# Patient Record
Sex: FEMALE | Race: BLACK OR AFRICAN AMERICAN | HISPANIC OR LATINO
[De-identification: names, ages, dates, MRNs, and addresses within clinical notes are randomized per-mention and may not be internally consistent; named-entity substitution may affect disease eponyms.]

---

## 2021-06-23 ENCOUNTER — NON-APPOINTMENT (OUTPATIENT)
Age: 46
End: 2021-06-23

## 2021-06-23 PROBLEM — Z00.00 ENCOUNTER FOR PREVENTIVE HEALTH EXAMINATION: Status: ACTIVE | Noted: 2021-06-23

## 2021-06-24 ENCOUNTER — APPOINTMENT (OUTPATIENT)
Dept: OBGYN | Facility: CLINIC | Age: 46
End: 2021-06-24
Payer: COMMERCIAL

## 2021-06-24 VITALS
SYSTOLIC BLOOD PRESSURE: 120 MMHG | HEIGHT: 66 IN | BODY MASS INDEX: 26.52 KG/M2 | DIASTOLIC BLOOD PRESSURE: 70 MMHG | WEIGHT: 165 LBS

## 2021-06-24 DIAGNOSIS — Z83.3 FAMILY HISTORY OF DIABETES MELLITUS: ICD-10-CM

## 2021-06-24 DIAGNOSIS — Z78.9 OTHER SPECIFIED HEALTH STATUS: ICD-10-CM

## 2021-06-24 PROCEDURE — 99205 OFFICE O/P NEW HI 60 MIN: CPT

## 2021-06-24 PROCEDURE — 99072 ADDL SUPL MATRL&STAF TM PHE: CPT

## 2021-07-06 PROBLEM — Z83.3 FAMILY HISTORY OF TYPE 2 DIABETES MELLITUS: Status: ACTIVE | Noted: 2021-07-06

## 2021-07-06 PROBLEM — Z78.9 NON-SMOKER: Status: ACTIVE | Noted: 2021-07-06

## 2021-07-06 PROBLEM — Z78.9 NO PERTINENT PAST MEDICAL HISTORY: Status: RESOLVED | Noted: 2021-07-06 | Resolved: 2021-07-06

## 2021-07-06 PROBLEM — Z78.9 SOCIAL ALCOHOL USE: Status: ACTIVE | Noted: 2021-07-06

## 2021-07-06 PROBLEM — Z78.9 DOES NOT USE ILLICIT DRUGS: Status: ACTIVE | Noted: 2021-07-06

## 2021-07-06 NOTE — HISTORY OF PRESENT ILLNESS
[Patient reported mammogram was normal] : Patient reported mammogram was normal [Patient reported PAP Smear was normal] : Patient reported PAP Smear was normal [N] : Patient does not use contraception [Y] : Patient is sexually active [Normal Amount/Duration] :  normal amount and duration [Regular Cycle Intervals] : periods have been regular [Menarche Age: ____] : age at menarche was [unfilled] [Currently Active] : currently active [Mammogramdate] : 05/2021 [PapSmeardate] : 03/2021 [LMPDate] : 06/24/2021 [PGHxTotal] : 2 [[facility] : [facility] [FreeTextEntry1] : 06/24/2021 [Men] : men

## 2021-07-06 NOTE — DISCUSSION/SUMMARY
[FreeTextEntry1] : I sat down with the patient to discuss the imaging findings & her symptoms which warrant surgical intervention. I explained that the heavy bleeding and pain bleeding is likely related to myomata, adenomyosis and also enlarged uterus.  Discussion about management options for heavy bleeding and pain including hormonal and non-hormonal medication, pain medication.  Oral contraceptive pill, LNG IUD, NuvaRing and TXA. Hysteroscopy with endometrial ablation.  Uterine artery embolization.  Also myomectomy and hysterectomy. We discussed type of hysterectomy including total and supracervical.  We reviewed that there is no proven medical benefit to keeping the cervix, and removal in the future is more difficult.  I recommend definitive hysterectomy with bilateral salpingectomy via a minimally invasive approach.   \par \par The options for surgical approach including open, vaginal, and laparoscopic with or without robotic assistance were discussed and the patient agrees with plan for laparoscopic hysterectomy with bilateral salpingectomy.  The differential diagnosis was discussed in detail. The indications, risks, benefits and alternatives were discussed. but not limited to, conversion to laparotomy, bleeding, infection, injury to surrounding organs was discussed at length.  Chance of occult injury and need for future surgery.  Deon Nicolas expressed an understanding of the treatment rationale and her questions were answered to her apparent satisfaction.  She was given written information about postoperative care and diagrams of the pelvic anatomy.\par \par

## 2021-07-06 NOTE — PHYSICAL EXAM
[Appropriately responsive] : appropriately responsive [Alert] : alert [No Acute Distress] : no acute distress [Soft] : soft [Non-tender] : non-tender [Non-distended] : non-distended [Oriented x3] : oriented x3 [Labia Majora] : normal [Labia Minora] : normal [Normal] : normal [Tenderness] : nontender [Enlarged ___ wks] : enlarged [unfilled] ~Uweeks [Uterine Adnexae] : normal

## 2021-07-21 ENCOUNTER — NON-APPOINTMENT (OUTPATIENT)
Age: 46
End: 2021-07-21

## 2021-08-20 ENCOUNTER — LABORATORY RESULT (OUTPATIENT)
Age: 46
End: 2021-08-20

## 2021-08-20 VITALS
SYSTOLIC BLOOD PRESSURE: 126 MMHG | RESPIRATION RATE: 16 BRPM | DIASTOLIC BLOOD PRESSURE: 78 MMHG | HEIGHT: 66 IN | TEMPERATURE: 98 F | WEIGHT: 164.02 LBS | HEART RATE: 69 BPM | OXYGEN SATURATION: 100 %

## 2021-08-20 NOTE — PRE-OP CHECKLIST - COMMENTS
[phone] (M)    Clemente Sanchez MD   Family Practice    Payor: [employer] / Plan: [employer] PREFERRED HPN [id number] / Product Type: PPO MISC    From: Jordan Alaniz MD  Sent: 1/2/2019   2:24 PM    RESECTION SOFT TISSUE MASS left thigh, with excision of skin lesion  [address]    Surgery scheduling requirements include:  Date of Surgery: Elective- anytime--asap and anytime  Facility: [facility]  Admission Type: Admit to outpatient in a bed  Time Needed: 60 MINS  Anesthesia: General  Surgical Assist: yes, surgical assist and yes, Regla Jaquez NP  Special Equipment: SOFT TISSUE TRAY    Consent: At Facility  Blood Donation: none  Pathway class: no  PREHAB needed? no    Preop history and physical: Yes, with patient's PCP  Preop testing: CBC, BMP, CXR, EKG   Special preop instructions: Bilateral TEDS and SCD's, shave operative site preop, capped IV.  Preop antibiotics: If <150# Ancef 1 gm IV, if >150# Ancef 2 gm IV less than 30 minutes before surgery. If patient has allergy to PCN, then give Vancomycin 1 gram IVPB over 60 minutes (not more than 90 minutes prior to surgery)  Service to Anticoagulation Clinic needed postop: No     Schedule postop appointment for 2 weeks postop.      
Contacted patient to confirm surgery for 1/8/19 scheduled at 7 am with an arrival time of 5 am. Left VM for patient to return call to confirm.       
Patient has decided  1/8/19    Confirmed surgery at [facility] with Gavin    Laterality confirmed:  Left thigh    PCP - Dr muse - pre-op on 12/7  NPO - ok  ASA - no  Ride after - yes  Work Comp - no  Insurance - yes  1st Post Op - 1/23/19 at 1030  2nd Post Op - 3/11/19 at 915 - [place]    Case created yes  Service to Family Practice done  Scheduled in [place]  Letter to Patient sent     Patient stated when surgery was discussed with Dr Alaniz, it was mentioned that local would be used instead of general anesthesia.  is awaiting response from Dr Alaniz    Update: Dr Alaniz has agreed to use Mac and Local anesthesia. Patient is aware.     Anesthesia has been changed with Gavin in OR  
Pre-Admissions has confirmed surgery time and arrival.   
Had electrolyte drink this am

## 2021-08-22 ENCOUNTER — TRANSCRIPTION ENCOUNTER (OUTPATIENT)
Age: 46
End: 2021-08-22

## 2021-08-23 ENCOUNTER — RESULT REVIEW (OUTPATIENT)
Age: 46
End: 2021-08-23

## 2021-08-23 ENCOUNTER — INPATIENT (INPATIENT)
Facility: HOSPITAL | Age: 46
LOS: 1 days | Discharge: HOME | DRG: 742 | End: 2021-08-25
Attending: OBSTETRICS & GYNECOLOGY | Admitting: OBSTETRICS & GYNECOLOGY
Payer: COMMERCIAL

## 2021-08-23 ENCOUNTER — TRANSCRIPTION ENCOUNTER (OUTPATIENT)
Age: 46
End: 2021-08-23

## 2021-08-23 ENCOUNTER — APPOINTMENT (OUTPATIENT)
Dept: OBGYN | Facility: HOSPITAL | Age: 46
End: 2021-08-23

## 2021-08-23 DIAGNOSIS — Z98.890 OTHER SPECIFIED POSTPROCEDURAL STATES: Chronic | ICD-10-CM

## 2021-08-23 DIAGNOSIS — D25.9 LEIOMYOMA OF UTERUS, UNSPECIFIED: ICD-10-CM
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
## 2021-08-23 DIAGNOSIS — Z98.51 TUBAL LIGATION STATUS: Chronic | ICD-10-CM

## 2021-08-23 DIAGNOSIS — Z98.891 HISTORY OF UTERINE SCAR FROM PREVIOUS SURGERY: Chronic | ICD-10-CM

## 2021-08-23 DIAGNOSIS — N99.71 ACCIDENTAL PUNCTURE AND LACERATION OF A GENITOURINARY SYSTEM ORGAN OR STRUCTURE DURING A GENITOURINARY SYSTEM PROCEDURE: ICD-10-CM
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
## 2021-08-23 DIAGNOSIS — N32.89 OTHER SPECIFIED DISORDERS OF BLADDER: ICD-10-CM

## 2021-08-23 LAB
BLD GP AB SCN SERPL QL: NEGATIVE — SIGNIFICANT CHANGE UP
GLUCOSE BLDC GLUCOMTR-MCNC: 91 MG/DL — SIGNIFICANT CHANGE UP (ref 70–99)
RH IG SCN BLD-IMP: POSITIVE — SIGNIFICANT CHANGE UP

## 2021-08-23 PROCEDURE — 88307 TISSUE EXAM BY PATHOLOGIST: CPT | Mod: 26

## 2021-08-23 PROCEDURE — 51865 REPAIR OF BLADDER WOUND: CPT

## 2021-08-23 PROCEDURE — 99233 SBSQ HOSP IP/OBS HIGH 50: CPT | Mod: 25

## 2021-08-23 PROCEDURE — 58573 TLH W/T/O UTERUS OVER 250 G: CPT

## 2021-08-23 RX ORDER — OXYCODONE HYDROCHLORIDE 5 MG/1
5 TABLET ORAL
Refills: 0 | Status: DISCONTINUED | OUTPATIENT
Start: 2021-08-23 | End: 2021-08-25

## 2021-08-23 RX ORDER — CELECOXIB 200 MG/1
400 CAPSULE ORAL ONCE
Refills: 0 | Status: COMPLETED | OUTPATIENT
Start: 2021-08-23 | End: 2021-08-23

## 2021-08-23 RX ORDER — HEPARIN SODIUM 5000 [USP'U]/ML
5000 INJECTION INTRAVENOUS; SUBCUTANEOUS ONCE
Refills: 0 | Status: COMPLETED | OUTPATIENT
Start: 2021-08-23 | End: 2021-08-23

## 2021-08-23 RX ORDER — SODIUM CHLORIDE 9 MG/ML
1000 INJECTION, SOLUTION INTRAVENOUS
Refills: 0 | Status: DISCONTINUED | OUTPATIENT
Start: 2021-08-23 | End: 2021-08-24

## 2021-08-23 RX ORDER — OXYBUTYNIN CHLORIDE 5 MG
5 TABLET ORAL
Refills: 0 | Status: DISCONTINUED | OUTPATIENT
Start: 2021-08-23 | End: 2021-08-25

## 2021-08-23 RX ORDER — ONDANSETRON 8 MG/1
8 TABLET, FILM COATED ORAL EVERY 8 HOURS
Refills: 0 | Status: DISCONTINUED | OUTPATIENT
Start: 2021-08-23 | End: 2021-08-25

## 2021-08-23 RX ORDER — ACETAMINOPHEN 500 MG
1000 TABLET ORAL ONCE
Refills: 0 | Status: COMPLETED | OUTPATIENT
Start: 2021-08-23 | End: 2021-08-23

## 2021-08-23 RX ORDER — GABAPENTIN 400 MG/1
600 CAPSULE ORAL ONCE
Refills: 0 | Status: COMPLETED | OUTPATIENT
Start: 2021-08-23 | End: 2021-08-23

## 2021-08-23 RX ORDER — OXYCODONE HYDROCHLORIDE 5 MG/1
10 TABLET ORAL EVERY 4 HOURS
Refills: 0 | Status: DISCONTINUED | OUTPATIENT
Start: 2021-08-23 | End: 2021-08-25

## 2021-08-23 RX ORDER — KETOROLAC TROMETHAMINE 30 MG/ML
30 SYRINGE (ML) INJECTION EVERY 8 HOURS
Refills: 0 | Status: DISCONTINUED | OUTPATIENT
Start: 2021-08-23 | End: 2021-08-24

## 2021-08-23 RX ORDER — SIMETHICONE 80 MG/1
80 TABLET, CHEWABLE ORAL EVERY 6 HOURS
Refills: 0 | Status: DISCONTINUED | OUTPATIENT
Start: 2021-08-23 | End: 2021-08-25

## 2021-08-23 RX ORDER — ACETAMINOPHEN 500 MG
1000 TABLET ORAL EVERY 6 HOURS
Refills: 0 | Status: DISCONTINUED | OUTPATIENT
Start: 2021-08-23 | End: 2021-08-25

## 2021-08-23 RX ORDER — HYDROMORPHONE HYDROCHLORIDE 2 MG/ML
0.5 INJECTION INTRAMUSCULAR; INTRAVENOUS; SUBCUTANEOUS
Refills: 0 | Status: DISCONTINUED | OUTPATIENT
Start: 2021-08-23 | End: 2021-08-25

## 2021-08-23 RX ORDER — POLYETHYLENE GLYCOL 3350 17 G/17G
17 POWDER, FOR SOLUTION ORAL AT BEDTIME
Refills: 0 | Status: DISCONTINUED | OUTPATIENT
Start: 2021-08-23 | End: 2021-08-25

## 2021-08-23 RX ADMIN — OXYCODONE HYDROCHLORIDE 10 MILLIGRAM(S): 5 TABLET ORAL at 20:55

## 2021-08-23 RX ADMIN — Medication 30 MILLIGRAM(S): at 17:37

## 2021-08-23 RX ADMIN — CELECOXIB 400 MILLIGRAM(S): 200 CAPSULE ORAL at 14:57

## 2021-08-23 RX ADMIN — HEPARIN SODIUM 5000 UNIT(S): 5000 INJECTION INTRAVENOUS; SUBCUTANEOUS at 09:34

## 2021-08-23 RX ADMIN — OXYCODONE HYDROCHLORIDE 10 MILLIGRAM(S): 5 TABLET ORAL at 21:43

## 2021-08-23 RX ADMIN — Medication 1000 MILLIGRAM(S): at 19:00

## 2021-08-23 RX ADMIN — Medication 1000 MILLIGRAM(S): at 14:57

## 2021-08-23 RX ADMIN — Medication 1000 MILLIGRAM(S): at 18:04

## 2021-08-23 RX ADMIN — Medication 1 TABLET(S): at 18:02

## 2021-08-23 RX ADMIN — Medication 30 MILLIGRAM(S): at 16:20

## 2021-08-23 RX ADMIN — GABAPENTIN 600 MILLIGRAM(S): 400 CAPSULE ORAL at 09:34

## 2021-08-23 RX ADMIN — Medication 1000 MILLIGRAM(S): at 23:44

## 2021-08-23 RX ADMIN — CELECOXIB 400 MILLIGRAM(S): 200 CAPSULE ORAL at 09:33

## 2021-08-23 RX ADMIN — Medication 5 MILLIGRAM(S): at 17:51

## 2021-08-23 RX ADMIN — Medication 1000 MILLIGRAM(S): at 09:34

## 2021-08-23 RX ADMIN — SODIUM CHLORIDE 125 MILLILITER(S): 9 INJECTION, SOLUTION INTRAVENOUS at 14:57

## 2021-08-23 NOTE — H&P ADULT - HISTORY OF PRESENT ILLNESS
25 to presents for a scheduled TL/S TLH  + BS due to symptomatic myomata. Had recent imaging and uterus and myomata has grown. Uterus is 14 x 8.3 x 13.8 cm with several myomata. Cycles are heavier with cramping, she can feel her abdomen moving. She reports that she had tubal ligation at the time of her second  sections.     Preventative Visit:   Mammogram: 2021, Patient reported mammogram was normal.   PAP Smear: 2021, Patient reported PAP Smear was normal.   LMP: 2021   Contraception: Patient does not use contraception.   Sexually Activity: Patient is sexually active.    s/p CS x2    Menstrual history:   LMP: 2021, normal amount and duration.   Menses: periods have been regular, age at menarche was 12.     Active Problems  Enlarged uterus   Menorrhagia with regular cycle  Uterine myoma     Past Medical History  No pertinent past medical history     Surgical History  History of  Section x2  Bilateral tubal ligation    Vital Signs Last 24 Hrs  T(C): --  T(F): --  HR: --  BP: --  BP(mean): --  RR: --  SpO2: --    Physical Exam:  Gen: NAD, comfortable  GI: soft, nontender, nondistended + BS, no rebound no guarding  Ext: no edema, erythema, tenderness     LABS:  HB - 11.2  Cr - 0.76     46 to presents for a scheduled TL/S TLH  + BS due to symptomatic myomata. Had recent imaging and uterus and myomata has grown. Uterus is 14 x 8.3 x 13.8 cm with several myomata. Cycles are heavier with cramping, she can feel her abdomen moving. She reports that she had tubal ligation at the time of her second  sections.     Preventative Visit:   Mammogram: 2021, Patient reported mammogram was normal.   PAP Smear: 2021, Patient reported PAP Smear was normal.   LMP: 2021   Contraception: Patient does not use contraception.   Sexually Activity: Patient is sexually active.    s/p CS x2    Menstrual history:   LMP: 2021, normal amount and duration.   Menses: periods have been regular, age at menarche was 12.     Active Problems  Enlarged uterus   Menorrhagia with regular cycle  Uterine myoma     Past Medical History  No pertinent past medical history     Surgical History  History of  Section x2  Bilateral tubal ligation    Vital Signs Last 24 Hrs  T(C): --  T(F): --  HR: --  BP: --  BP(mean): --  RR: --  SpO2: --    Physical Exam:  Gen: NAD, comfortable  GI: soft, nontender, nondistended + BS, no rebound no guarding  Ext: no edema, erythema, tenderness     LABS:  HB - 11.2  Cr - 0.76

## 2021-08-23 NOTE — PROGRESS NOTE ADULT - SUBJECTIVE AND OBJECTIVE BOX
GYN Post Op Check     Patient seen at bedside.  Pain controlled. Tolerating sips. Not OOB yet. Watt in place.    Denies CP, palpitations, SOB, fever, chills, nausea, vomiting.    Vital Signs Last 24 Hrs  T(C): 37.2 (23 Aug 2021 20:00), Max: 37.2 (23 Aug 2021 20:00)  T(F): 98.9 (23 Aug 2021 20:00), Max: 98.9 (23 Aug 2021 20:00)  HR: 70 (23 Aug 2021 20:00) (68 - 80)  BP: 122/76 (23 Aug 2021 20:00) (122/76 - 141/74)  BP(mean): 92 (23 Aug 2021 16:57) (92 - 102)  RR: 17 (23 Aug 2021 20:00) (14 - 20)  SpO2: 98% (23 Aug 2021 20:00) (98% - 100%)    Physical Exam:  Gen: Well-appearing. NAD.  Resp: Breathing comfortably on RA. Lungs CTAB.  Abd: Soft, appropriately tender, mildly distended, decreased BS, no rebound, no guarding. Dermabond C/D/I. ORACIO drain in place with serosanguinous fluid.  : Watt to gravity draining clear urine.  Ext: SCDs in place. No calf tenderness.    I&O's Summary    23 Aug 2021 07:01  -  23 Aug 2021 23:00  --------------------------------------------------------  IN: 975 mL / OUT: 905 mL / NET: 70 mL      MEDICATIONS  (STANDING):  acetaminophen   Tablet .. 1000 milliGRAM(s) Oral every 6 hours  ketorolac   Injectable 30 milliGRAM(s) IV Push every 8 hours  lactated ringers. 1000 milliLiter(s) (125 mL/Hr) IV Continuous <Continuous>  oxybutynin 5 milliGRAM(s) Oral two times a day  trimethoprim   80 mG/sulfamethoxazole 400 mG 1 Tablet(s) Oral daily    MEDICATIONS  (PRN):  acetaminophen   Tablet .. 1000 milliGRAM(s) Oral every 6 hours PRN Mild Pain (1 - 3)  HYDROmorphone  Injectable 0.5 milliGRAM(s) IV Push every 15 minutes PRN Severe Pain (7 - 10)  ondansetron    Tablet 8 milliGRAM(s) Oral every 8 hours PRN Nausea and/or Vomiting  oxyCODONE    IR 5 milliGRAM(s) Oral every 3 hours PRN Moderate Pain (4 - 6)  oxyCODONE    IR 10 milliGRAM(s) Oral every 4 hours PRN Severe Pain (7 - 10)  polyethylene glycol 3350 17 Gram(s) Oral at bedtime PRN Constipation  simethicone 80 milliGRAM(s) Chew every 6 hours PRN Gas    Allergies    No Known Allergies    Intolerances        LABS:

## 2021-08-23 NOTE — PROGRESS NOTE ADULT - ASSESSMENT
45 yo POD#0 s/p TLH+ BS and cystotomy repair (by urology) for a 16 weeks fibroid uterus    1. Neuro/Pain:  Tylenol, Toradol and Dilaudid  2  CV: VS per routine  3. Pulm: Encourage ISS  4. GI: Clears, consider advance in the AM  5. :  Martinez to remain for 14 days (until 9/6), Urology will coordinate CT cystogram prior to removal of the martinez as an out patient. Check Cr from the ORACIO drain at the day of dispo. Oxybutnin 5mg BID for bladder spasm  6. Heme: AM Labs  7. ID: single strength Bactrim (80/400) daily as long as Martinez in.  8. lines: LLQ 19F ORACIO drain (125cc over 2h)   9. DVT ppx: SCDs, Lovenox in the AM after CBC        Christina Al MD PGY2

## 2021-08-23 NOTE — BRIEF OPERATIVE NOTE - OPERATION/FINDINGS
S/p previous C/S x2. Adhesions between anterior part of the uterus and the bladder and abdominal wall.  Fibroid uterus 16 weeks in size (s/p previous BTL).  Otherwise normal abdominal anatomy.  After dissecting the cervix and uterus from the vagina a cystotomy was noted - urology team called to participate in the repair.  A mini-lap of 4cm was done on previous Pfannenstiel incision to allow bladder repair (see brief op note buy urology).  Through the mini-lap the uterus was removed in pieces.  Cystoscopy at the end of the case shoed closed suture line and normal trigon with bilateral ureteral jets seen.  A 19F ORACIO drain was inserted for postop surveillance.

## 2021-08-23 NOTE — BRIEF OPERATIVE NOTE - OPERATION/FINDINGS
see dictation 6 cm tear at dome of bladder, extending posteriorly.; UOs intact, clear efflux seen before and after repair. Posterior wall denuded. Multilayer closure performed. See dictation for further detail.

## 2021-08-23 NOTE — H&P ADULT - ASSESSMENT
25 to presents for a scheduled TL/S TLH  + BS due to symptomatic myomata.  Admission pending intra-op findings. 46 to presents for a scheduled TL/S TLH  + BS due to symptomatic myomata.  Admission pending intra-op findings.

## 2021-08-23 NOTE — H&P ADULT - NSICDXPASTSURGICALHX_GEN_ALL_CORE_FT
PAST SURGICAL HISTORY:  H/O  section x 2    H/O tubal ligation     S/P excision of lipoma leg, arm

## 2021-08-23 NOTE — BRIEF OPERATIVE NOTE - NSICDXBRIEFPROCEDURE_GEN_ALL_CORE_FT
PROCEDURES:  Repair of cystotomy 23-Aug-2021 14:15:36  Knae Plata  
PROCEDURES:  Repair of cystotomy 23-Aug-2021 14:15:36  Kane Plata  Laparoscopic total hysterectomy with cystoscopy 23-Aug-2021 14:37:25  Mark Noel  Laparoscopic salpingectomy 23-Aug-2021 14:37:34  Mark Noel

## 2021-08-23 NOTE — BRIEF OPERATIVE NOTE - NSICDXBRIEFPOSTOP_GEN_ALL_CORE_FT
POST-OP DIAGNOSIS:  Fibroid uterus 23-Aug-2021 14:16:28  Kane Plata  
POST-OP DIAGNOSIS:  Fibroid uterus 23-Aug-2021 14:16:28  Kane Plata

## 2021-08-23 NOTE — BRIEF OPERATIVE NOTE - NSICDXBRIEFPREOP_GEN_ALL_CORE_FT
PRE-OP DIAGNOSIS:  Fibroid uterus 23-Aug-2021 14:16:05  Kane Plata  
PRE-OP DIAGNOSIS:  Fibroid uterus 23-Aug-2021 14:16:05  Kane Plata

## 2021-08-24 LAB
ANION GAP SERPL CALC-SCNC: 7 MMOL/L — SIGNIFICANT CHANGE UP (ref 5–17)
BUN SERPL-MCNC: 7 MG/DL — SIGNIFICANT CHANGE UP (ref 7–23)
CALCIUM SERPL-MCNC: 8.6 MG/DL — SIGNIFICANT CHANGE UP (ref 8.4–10.5)
CHLORIDE SERPL-SCNC: 106 MMOL/L — SIGNIFICANT CHANGE UP (ref 96–108)
CO2 SERPL-SCNC: 26 MMOL/L — SIGNIFICANT CHANGE UP (ref 22–31)
COVID-19 SPIKE DOMAIN AB INTERP: POSITIVE
COVID-19 SPIKE DOMAIN ANTIBODY RESULT: >250 U/ML — HIGH
CREAT FLD-MCNC: 0.89 MG/DL — SIGNIFICANT CHANGE UP
CREAT SERPL-MCNC: 0.72 MG/DL — SIGNIFICANT CHANGE UP (ref 0.5–1.3)
GLUCOSE SERPL-MCNC: 95 MG/DL — SIGNIFICANT CHANGE UP (ref 70–99)
HCT VFR BLD CALC: 27.3 % — LOW (ref 34.5–45)
HGB BLD-MCNC: 8.7 G/DL — LOW (ref 11.5–15.5)
MAGNESIUM SERPL-MCNC: 1.7 MG/DL — SIGNIFICANT CHANGE UP (ref 1.6–2.6)
MCHC RBC-ENTMCNC: 29 PG — SIGNIFICANT CHANGE UP (ref 27–34)
MCHC RBC-ENTMCNC: 31.9 GM/DL — LOW (ref 32–36)
MCV RBC AUTO: 91 FL — SIGNIFICANT CHANGE UP (ref 80–100)
NRBC # BLD: 0 /100 WBCS — SIGNIFICANT CHANGE UP (ref 0–0)
PHOSPHATE SERPL-MCNC: 3 MG/DL — SIGNIFICANT CHANGE UP (ref 2.5–4.5)
PLATELET # BLD AUTO: 186 K/UL — SIGNIFICANT CHANGE UP (ref 150–400)
POTASSIUM SERPL-MCNC: 3.8 MMOL/L — SIGNIFICANT CHANGE UP (ref 3.5–5.3)
POTASSIUM SERPL-SCNC: 3.8 MMOL/L — SIGNIFICANT CHANGE UP (ref 3.5–5.3)
RBC # BLD: 3 M/UL — LOW (ref 3.8–5.2)
RBC # FLD: 15.4 % — HIGH (ref 10.3–14.5)
SARS-COV-2 IGG+IGM SERPL QL IA: >250 U/ML — HIGH
SARS-COV-2 IGG+IGM SERPL QL IA: POSITIVE
SODIUM SERPL-SCNC: 139 MMOL/L — SIGNIFICANT CHANGE UP (ref 135–145)
WBC # BLD: 7.27 K/UL — SIGNIFICANT CHANGE UP (ref 3.8–10.5)
WBC # FLD AUTO: 7.27 K/UL — SIGNIFICANT CHANGE UP (ref 3.8–10.5)

## 2021-08-24 PROCEDURE — 99232 SBSQ HOSP IP/OBS MODERATE 35: CPT

## 2021-08-24 PROCEDURE — 99024 POSTOP FOLLOW-UP VISIT: CPT

## 2021-08-24 RX ORDER — POTASSIUM CHLORIDE 20 MEQ
20 PACKET (EA) ORAL ONCE
Refills: 0 | Status: COMPLETED | OUTPATIENT
Start: 2021-08-24 | End: 2021-08-24

## 2021-08-24 RX ORDER — ENOXAPARIN SODIUM 100 MG/ML
40 INJECTION SUBCUTANEOUS EVERY 24 HOURS
Refills: 0 | Status: DISCONTINUED | OUTPATIENT
Start: 2021-08-24 | End: 2021-08-25

## 2021-08-24 RX ORDER — MAGNESIUM SULFATE 500 MG/ML
2 VIAL (ML) INJECTION ONCE
Refills: 0 | Status: COMPLETED | OUTPATIENT
Start: 2021-08-24 | End: 2021-08-24

## 2021-08-24 RX ORDER — IBUPROFEN 200 MG
600 TABLET ORAL EVERY 6 HOURS
Refills: 0 | Status: DISCONTINUED | OUTPATIENT
Start: 2021-08-24 | End: 2021-08-25

## 2021-08-24 RX ADMIN — Medication 30 MILLIGRAM(S): at 06:00

## 2021-08-24 RX ADMIN — Medication 1 TABLET(S): at 11:09

## 2021-08-24 RX ADMIN — Medication 1000 MILLIGRAM(S): at 05:12

## 2021-08-24 RX ADMIN — Medication 600 MILLIGRAM(S): at 17:50

## 2021-08-24 RX ADMIN — Medication 600 MILLIGRAM(S): at 17:15

## 2021-08-24 RX ADMIN — Medication 5 MILLIGRAM(S): at 05:12

## 2021-08-24 RX ADMIN — OXYCODONE HYDROCHLORIDE 10 MILLIGRAM(S): 5 TABLET ORAL at 23:00

## 2021-08-24 RX ADMIN — Medication 1000 MILLIGRAM(S): at 00:44

## 2021-08-24 RX ADMIN — Medication 1000 MILLIGRAM(S): at 17:15

## 2021-08-24 RX ADMIN — Medication 1000 MILLIGRAM(S): at 06:00

## 2021-08-24 RX ADMIN — Medication 5 MILLIGRAM(S): at 17:14

## 2021-08-24 RX ADMIN — Medication 20 MILLIEQUIVALENT(S): at 09:19

## 2021-08-24 RX ADMIN — ENOXAPARIN SODIUM 40 MILLIGRAM(S): 100 INJECTION SUBCUTANEOUS at 07:52

## 2021-08-24 RX ADMIN — OXYCODONE HYDROCHLORIDE 10 MILLIGRAM(S): 5 TABLET ORAL at 22:12

## 2021-08-24 RX ADMIN — Medication 600 MILLIGRAM(S): at 11:09

## 2021-08-24 RX ADMIN — Medication 50 GRAM(S): at 09:19

## 2021-08-24 RX ADMIN — Medication 1000 MILLIGRAM(S): at 11:38

## 2021-08-24 RX ADMIN — Medication 1000 MILLIGRAM(S): at 17:50

## 2021-08-24 RX ADMIN — Medication 1000 MILLIGRAM(S): at 11:08

## 2021-08-24 RX ADMIN — Medication 600 MILLIGRAM(S): at 11:38

## 2021-08-24 RX ADMIN — Medication 30 MILLIGRAM(S): at 05:12

## 2021-08-24 NOTE — PROGRESS NOTE ADULT - ASSESSMENT
45 yo POD#1 s/p TLH+ BS and cystotomy repair (by urology) and cystoscopy, for a 16 weeks fibroid uterus  PostOp HB - pending  Martinez to remain in place for 14 days.    1. Neuro/Pain:  Tylenol, Toradol  2  CV: VS per routine  3. Pulm: Encourage ISS  4. GI: Clears, will advance to LRD  5. :  Martinez to remain for 14 days (until 9/6), Urology will coordinate CT cystogram prior to removal of the martinez as an out patient. Check Cr from the ORACIO drain at the day of dispo. Oxybutnin 5mg BID for bladder spasm  6. Heme: AM Labs  7. ID: single strength Bactrim (80/400) daily as long as Martinez in.  8. lines: LLQ 19F ORACIO drain (125cc over 2h)   9. DVT ppx: SCDs, Lovenox in the AM after CBC

## 2021-08-24 NOTE — PROGRESS NOTE ADULT - SUBJECTIVE AND OBJECTIVE BOX
SUBJECTIVE: NAEON. AVSS. Denies f/c/n/v, back/flank pain. Appropriate abdominal pain.    enoxaparin Injectable 40 milliGRAM(s) SubCutaneous every 24 hours  trimethoprim   80 mG/sulfamethoxazole 400 mG 1 Tablet(s) Oral daily      Vital Signs Last 24 Hrs  T(C): 36.8 (24 Aug 2021 08:05), Max: 37.2 (23 Aug 2021 20:00)  T(F): 98.3 (24 Aug 2021 08:05), Max: 98.9 (23 Aug 2021 20:00)  HR: 61 (24 Aug 2021 08:05) (60 - 80)  BP: 121/76 (24 Aug 2021 08:05) (112/69 - 141/74)  BP(mean): 92 (23 Aug 2021 16:57) (92 - 102)  RR: 18 (24 Aug 2021 08:05) (14 - 20)  SpO2: 98% (24 Aug 2021 08:05) (98% - 100%)  I&O's Detail    23 Aug 2021 07:01  -  24 Aug 2021 07:00  --------------------------------------------------------  IN:    Lactated Ringers: 2000 mL    Oral Fluid: 460 mL  Total IN: 2460 mL    OUT:    Bulb (mL): 305 mL    Indwelling Catheter - Urethral (mL): 1430 mL  Total OUT: 1735 mL    Total NET: 725 mL      24 Aug 2021 07:01  -  24 Aug 2021 11:01  --------------------------------------------------------  IN:    Oral Fluid: 360 mL  Total IN: 360 mL    OUT:    Bulb (mL): 30 mL  Total OUT: 30 mL    Total NET: 330 mL          Physical Exam:  General: No acute distress, resting comfortably in bed  Pulm: Nonlabored breathing, no respiratory distress  Abd: soft, appropriately TTP  : martinez in place draining clear yellow urine. ORACIO output SS.      LABS:                        8.7    7.27  )-----------( 186      ( 24 Aug 2021 07:48 )             27.3     08-24    139  |  106  |  7   ----------------------------<  95  3.8   |  26  |  0.72    Ca    8.6      24 Aug 2021 07:48  Phos  3.0     08-24  Mg     1.7     08-24            RADIOLOGY & ADDITIONAL STUDIES:

## 2021-08-24 NOTE — PROGRESS NOTE ADULT - ASSESSMENT
47 yo POD#1 s/p TLH+ BS and cystotomy repair (by urology) and cystoscopy, for a 16 weeks fibroid uterus. GABRIELLEEON. AVSS. Urine clear yellow. ORACIO SS with serous Cr.    Recommendations:  - martinez catheter for 10-14 days total  - CT Cystogram prior to martinez removal  - Should follow up as an outpatient with Dr. Christian in ~2 wks   47 yo POD#1 s/p TLH+ BS and cystotomy repair (by urology) and cystoscopy, for a 16 weeks fibroid uterus. NAEON. AVSS. Urine clear yellow. ORACIO SS with slightly elevated ORACIO Cr.    Recommendations:  - If pt staying another day, would keep ORACIO, trend output, and repeat ORACIO Cr tomorrow morning (8/25). If she is going to be discharged today, would recommend sending home with ORACIO and Dr. Christian would remove in the office.  - martinez catheter for 10-14 days total  - CT Cystogram prior to martinez removal  - Should follow up as an outpatient with Dr. Christian in ~2 wks   47 yo POD#1 s/p TLH+ BS and cystotomy repair (by urology) and cystoscopy, for a 16 weeks fibroid uterus. NAEON. AVSS. Urine clear yellow. ORACIO SS with slightly elevated ORACIO Cr (0.73 vs 0.89).    Recommendations:  - If pt staying another day, would keep ORACIO, trend output, and repeat ORACIO Cr tomorrow morning (8/25). If she is going to be discharged today, would recommend sending home with ORACIO and Dr. Christian would remove in the office.  - Martinez catheter for 10-14 days total  - CT Cystogram prior to martinez removal  - Should follow up as an outpatient with Dr. Christian in ~2 wks

## 2021-08-24 NOTE — PROGRESS NOTE ADULT - SUBJECTIVE AND OBJECTIVE BOX
GYN Progress Note    Patient seen at bedside.  Pain controlled on Tylenol and Toradol  Tolerating clears  Has not gotten OOB yet.  Watt is place - clear.  +flatus  Denies CP, palpitations, SOB, fever, chills, nausea, vomiting.    Vital Signs Last 24 Hrs  T(C): 37.1 (24 Aug 2021 05:13), Max: 37.2 (23 Aug 2021 20:00)  T(F): 98.7 (24 Aug 2021 05:13), Max: 98.9 (23 Aug 2021 20:00)  HR: 65 (24 Aug 2021 05:13) (60 - 80)  BP: 123/73 (24 Aug 2021 05:13) (112/69 - 141/74)  BP(mean): 92 (23 Aug 2021 16:57) (92 - 102)  RR: 15 (24 Aug 2021 05:13) (14 - 20)  SpO2: 100% (24 Aug 2021 05:13) (98% - 100%)    Physical Exam:  Gen: No Acute Distress  Pulm: Clear to auscultation bilaterally  GI: soft, appropriately nontender, nondistended, +BS, no rebound, no guarding.  Incision C/D/I  Ext: SCDs in place, wnl    I&O's Summary    23 Aug 2021 07:01  -  24 Aug 2021 07:00  --------------------------------------------------------  IN: 2460 mL / OUT: 1735 mL / NET: 725 mL      MEDICATIONS  (STANDING):  acetaminophen   Tablet .. 1000 milliGRAM(s) Oral every 6 hours  ketorolac   Injectable 30 milliGRAM(s) IV Push every 8 hours  lactated ringers. 1000 milliLiter(s) (125 mL/Hr) IV Continuous <Continuous>  oxybutynin 5 milliGRAM(s) Oral two times a day  trimethoprim   80 mG/sulfamethoxazole 400 mG 1 Tablet(s) Oral daily    MEDICATIONS  (PRN):  acetaminophen   Tablet .. 1000 milliGRAM(s) Oral every 6 hours PRN Mild Pain (1 - 3)  HYDROmorphone  Injectable 0.5 milliGRAM(s) IV Push every 15 minutes PRN Severe Pain (7 - 10)  ondansetron    Tablet 8 milliGRAM(s) Oral every 8 hours PRN Nausea and/or Vomiting  oxyCODONE    IR 5 milliGRAM(s) Oral every 3 hours PRN Moderate Pain (4 - 6)  oxyCODONE    IR 10 milliGRAM(s) Oral every 4 hours PRN Severe Pain (7 - 10)  polyethylene glycol 3350 17 Gram(s) Oral at bedtime PRN Constipation  simethicone 80 milliGRAM(s) Chew every 6 hours PRN Gas      LABS:

## 2021-08-25 ENCOUNTER — TRANSCRIPTION ENCOUNTER (OUTPATIENT)
Age: 46
End: 2021-08-25

## 2021-08-25 VITALS
OXYGEN SATURATION: 100 % | SYSTOLIC BLOOD PRESSURE: 108 MMHG | HEART RATE: 65 BPM | DIASTOLIC BLOOD PRESSURE: 67 MMHG | TEMPERATURE: 98 F | RESPIRATION RATE: 16 BRPM

## 2021-08-25 LAB
ANION GAP SERPL CALC-SCNC: 5 MMOL/L — SIGNIFICANT CHANGE UP (ref 5–17)
BUN SERPL-MCNC: 8 MG/DL — SIGNIFICANT CHANGE UP (ref 7–23)
CALCIUM SERPL-MCNC: 8.8 MG/DL — SIGNIFICANT CHANGE UP (ref 8.4–10.5)
CHLORIDE SERPL-SCNC: 109 MMOL/L — HIGH (ref 96–108)
CO2 SERPL-SCNC: 26 MMOL/L — SIGNIFICANT CHANGE UP (ref 22–31)
CREAT FLD-MCNC: 0.79 MG/DL — SIGNIFICANT CHANGE UP
CREAT SERPL-MCNC: 0.67 MG/DL — SIGNIFICANT CHANGE UP (ref 0.5–1.3)
GLUCOSE SERPL-MCNC: 91 MG/DL — SIGNIFICANT CHANGE UP (ref 70–99)
POTASSIUM SERPL-MCNC: 3.9 MMOL/L — SIGNIFICANT CHANGE UP (ref 3.5–5.3)
POTASSIUM SERPL-SCNC: 3.9 MMOL/L — SIGNIFICANT CHANGE UP (ref 3.5–5.3)
SODIUM SERPL-SCNC: 140 MMOL/L — SIGNIFICANT CHANGE UP (ref 135–145)

## 2021-08-25 PROCEDURE — 99024 POSTOP FOLLOW-UP VISIT: CPT

## 2021-08-25 PROCEDURE — 99231 SBSQ HOSP IP/OBS SF/LOW 25: CPT

## 2021-08-25 RX ORDER — OXYBUTYNIN CHLORIDE 5 MG
1 TABLET ORAL
Qty: 0 | Refills: 0 | DISCHARGE
Start: 2021-08-25

## 2021-08-25 RX ORDER — NAPROXEN 500 MG/1
500 TABLET ORAL
Qty: 1 | Refills: 2 | Status: ACTIVE | COMMUNITY
Start: 2021-08-25 | End: 1900-01-01

## 2021-08-25 RX ORDER — SULFAMETHOXAZOLE AND TRIMETHOPRIM 400; 80 MG/1; MG/1
400-80 TABLET ORAL DAILY
Qty: 15 | Refills: 3 | Status: ACTIVE | COMMUNITY
Start: 2021-08-25 | End: 1900-01-01

## 2021-08-25 RX ORDER — OXYBUTYNIN CHLORIDE 10 MG/1
10 TABLET, EXTENDED RELEASE ORAL
Qty: 15 | Refills: 0 | Status: ACTIVE | COMMUNITY
Start: 2021-08-25 | End: 1900-01-01

## 2021-08-25 RX ORDER — OXYCODONE 5 MG/1
5 TABLET ORAL
Qty: 5 | Refills: 0 | Status: ACTIVE | COMMUNITY
Start: 2021-08-25 | End: 1900-01-01

## 2021-08-25 RX ADMIN — Medication 600 MILLIGRAM(S): at 05:28

## 2021-08-25 RX ADMIN — Medication 600 MILLIGRAM(S): at 06:30

## 2021-08-25 RX ADMIN — Medication 1000 MILLIGRAM(S): at 06:30

## 2021-08-25 RX ADMIN — Medication 5 MILLIGRAM(S): at 05:29

## 2021-08-25 RX ADMIN — Medication 1 TABLET(S): at 12:01

## 2021-08-25 RX ADMIN — Medication 1000 MILLIGRAM(S): at 12:01

## 2021-08-25 RX ADMIN — ENOXAPARIN SODIUM 40 MILLIGRAM(S): 100 INJECTION SUBCUTANEOUS at 07:40

## 2021-08-25 RX ADMIN — Medication 1000 MILLIGRAM(S): at 05:29

## 2021-08-25 RX ADMIN — Medication 1000 MILLIGRAM(S): at 12:30

## 2021-08-25 RX ADMIN — Medication 600 MILLIGRAM(S): at 12:30

## 2021-08-25 RX ADMIN — Medication 600 MILLIGRAM(S): at 12:01

## 2021-08-25 NOTE — PROGRESS NOTE ADULT - ASSESSMENT
45 yo POD#2 s/p TLH+ BS and cystotomy repair (by urology) and cystoscopy, for a 16 weeks fibroid uterus  PostOp HB - 8.7  Martinez to remain in place for 14 days.    1. Neuro/Pain:  Tylenol, Motrin  2  CV: VS per routine  3. Pulm: Encourage ISS  4. GI: LRD  5. :  Martinez to remain for 14 days (until 9/6), Urology will coordinate CT cystogram prior to removal of the martinez as an out patient. Check Cr from the ORACIO drain at the day of dispo. Oxybutnin 5mg BID for bladder spasm. Will check ORACIO Cr this AM.  6. Heme: AM Labs  7. ID: single strength Bactrim (80/400) daily as long as Martinez in.  8. lines: LLQ 19F ORACIO drain (150cc overnight) - serosanguineous    9. DVT ppx: SCDs, Lovenox 40mg daily while in house.

## 2021-08-25 NOTE — DISCHARGE NOTE PROVIDER - NSDCCPTREATMENT_GEN_ALL_CORE_FT
PRINCIPAL PROCEDURE  Procedure: Laparoscopic total hysterectomy with cystoscopy  Findings and Treatment:       SECONDARY PROCEDURE  Procedure: Laparoscopic salpingectomy  Findings and Treatment:     Procedure: Repair of cystotomy  Findings and Treatment:

## 2021-08-25 NOTE — DISCHARGE NOTE NURSING/CASE MANAGEMENT/SOCIAL WORK - PATIENT PORTAL LINK FT
You can access the FollowMyHealth Patient Portal offered by Health system by registering at the following website: [email or web address]. By joining [facility]’s FollowMyHealth portal, you will also be able to view your health information using other applications (apps) compatible with our system.

## 2021-08-25 NOTE — DISCHARGE NOTE PROVIDER - NSDCMRMEDTOKEN_GEN_ALL_CORE_FT
oxybutynin 5 mg oral tablet: 1 tab(s) orally 2 times a day  sulfamethoxazole-trimethoprim 400 mg-80 mg oral tablet: 1 tab(s) orally once a day

## 2021-08-25 NOTE — DISCHARGE NOTE PROVIDER - NSDCFUADDINST_GEN_ALL_CORE_FT
- Nothing in vagina - no intercourse, tampons, or douching until cleared by your doctor.   - Avoid swimming, tub baths, and heavy lifting until cleared by your doctor.   - Showering is ok.   - Continue oral pain medications as needed for pain.    - Follow up in office in 2 weeks for your postoperative visit.    - Schedule an appointment with Dr. Christian to remove the ORACIO drain and manage the bladder martinez.  - Call the office sooner if you develop any fever, heavy bleeding, or severe pain.  Go to the closest emergency room for any of these symptoms if you are not able to contact your doctor.  - Nothing in vagina - no intercourse, tampons, or douching until cleared by your doctor.   - Avoid swimming, tub baths, and heavy lifting until cleared by your doctor.   - Showering is ok.   - Continue oral pain medications as needed for pain.    - Follow up in office in 2 weeks for your postoperative visit.    - Schedule an appointment in 2 days (8/27) with Dr. Christian to remove the ORACIO drain and manage the bladder Watt.  - Call the office sooner if you develop any fever, heavy bleeding, or severe pain.  Go to the closest emergency room for any of these symptoms if you are not able to contact your doctor.  - Nothing in vagina - no intercourse, tampons, or douching until cleared by your doctor.   - Avoid swimming, tub baths, and heavy lifting until cleared by your doctor.   - Showering is ok.   - Continue oral pain medications as needed for pain.    - Follow up in office in 2 weeks for your postoperative visit.    - Schedule an appointment in 2 days (8/27) with Dr. Christian to remove the ORACIO drain and manage the bladder Watt.  - Call the office sooner if you develop any fever, heavy bleeding, or severe pain.  Go to the closest emergency room for any of these symptoms if you are not able to contact your doctor.   -Pt to get Medicine prescribe at [facility] downstairs, take it as prescribed on the medication bottle- Naproxen & Percocet.

## 2021-08-25 NOTE — DISCHARGE NOTE PROVIDER - HOSPITAL COURSE
47 yo s/p TLH+ BS and cystotomy repair with a normal cystoscopy, for a 16 weeks fibroid uterus.  Watt to remain in place for 10-14 days, managed by urology team, covered by prophylactic antibiotics.  She was hemodynamically stable throughout her stay and at discharge, meeting all her post-op milestones.  ORACIO drain in place (left lower abdomen), will be removed by urology as an outpatient.

## 2021-08-25 NOTE — PROGRESS NOTE ADULT - SUBJECTIVE AND OBJECTIVE BOX
GYN Progress Note    Patient seen at bedside.  Pain controlled on Tylenol and Motrin  Tolerating low residue diet.  OOB   +flatus  Denies CP, palpitations, SOB, fever, chills, nausea, vomiting.    Vital Signs Last 24 Hrs  T(C): 36.7 (25 Aug 2021 04:49), Max: 36.9 (24 Aug 2021 16:48)  T(F): 98 (25 Aug 2021 04:49), Max: 98.5 (24 Aug 2021 16:48)  HR: 66 (25 Aug 2021 04:49) (61 - 78)  BP: 111/77 (25 Aug 2021 04:49) (111/53 - 125/61)  RR: 17 (25 Aug 2021 04:49) (17 - 18)  SpO2: 99% (25 Aug 2021 04:49) (98% - 100%)    Physical Exam:  Gen: No Acute Distress  Pulm: Clear to auscultation bilaterally  GI: soft, appropriately nontender, nondistended, +BS, no rebound, no guarding.  Incision C/D/I  Ext: SCDs in place, wnl    I&O's Summary    24 Aug 2021 07:01  -  25 Aug 2021 07:00  --------------------------------------------------------  IN: 1200 mL / OUT: 2500 mL / NET: -1300 mL      MEDICATIONS  (STANDING):  acetaminophen   Tablet .. 1000 milliGRAM(s) Oral every 6 hours  enoxaparin Injectable 40 milliGRAM(s) SubCutaneous every 24 hours  ibuprofen  Tablet. 600 milliGRAM(s) Oral every 6 hours  oxybutynin 5 milliGRAM(s) Oral two times a day  trimethoprim   80 mG/sulfamethoxazole 400 mG 1 Tablet(s) Oral daily    MEDICATIONS  (PRN):  acetaminophen   Tablet .. 1000 milliGRAM(s) Oral every 6 hours PRN Mild Pain (1 - 3)  HYDROmorphone  Injectable 0.5 milliGRAM(s) IV Push every 15 minutes PRN Severe Pain (7 - 10)  ondansetron    Tablet 8 milliGRAM(s) Oral every 8 hours PRN Nausea and/or Vomiting  oxyCODONE    IR 5 milliGRAM(s) Oral every 3 hours PRN Moderate Pain (4 - 6)  oxyCODONE    IR 10 milliGRAM(s) Oral every 4 hours PRN Severe Pain (7 - 10)  polyethylene glycol 3350 17 Gram(s) Oral at bedtime PRN Constipation  simethicone 80 milliGRAM(s) Chew every 6 hours PRN Gas      LABS:                        8.7    7.27  )-----------( 186      ( 24 Aug 2021 07:48 )             27.3     08-24    139  |  106  |  7   ----------------------------<  95  3.8   |  26  |  0.72    Ca    8.6      24 Aug 2021 07:48  Phos  3.0     08-24  Mg     1.7     08-24

## 2021-08-25 NOTE — PROGRESS NOTE ADULT - PROVIDER SPECIALTY LIST ADULT
Addended by: MALENA CRISTINA on: 6/3/2020 03:21 PM     Modules accepted: Orders    
Urology
GYN
GYN
OB
Urology

## 2021-08-25 NOTE — DISCHARGE NOTE PROVIDER - NSDCACTIVITY_GEN_ALL_CORE
Do not drive or operate machinery/Showering allowed/Do not make important decisions/No heavy lifting/straining/Walking - Outdoors allowed

## 2021-08-25 NOTE — DISCHARGE NOTE PROVIDER - CARE PROVIDER_API CALL
Bernard Pride)  Obstetrics and Gynecology  [address] [address]  Phone: [phone]  Fax: [phone]  Follow Up Time:     Magali Christian)  Female Pelvic MedReconst Surg; Urology  [address] [address]  Phone: [phone]  Fax: [phone]  Follow Up Time:

## 2021-08-25 NOTE — PROGRESS NOTE ADULT - ASSESSMENT
47 yo POD#1 s/p TLH+ BS and cystotomy repair (by urology) and cystoscopy, for a 16 weeks fibroid uterus. NAEON. AVSS. Urine clear yellow. ORACIO SS with slightly elevated ORACIO Cr 0.79 vs 0.67 in serum.    Recommendations:  - Please continue ORACIO at discharge. Pt should record outputs and follow up with Dr. Christian 3-4 days after discharge for possible removal.  - Martinez catheter for 10-14 days total  - CT Cystogram prior to martinez removal  - Possible martinez removal in ~2 wks pending CT cystogram results  - Discussed with  attending

## 2021-08-25 NOTE — PROGRESS NOTE ADULT - ATTENDING COMMENTS
Gyn attg:     patient doing well postoperatively. Pain well, tolerating diet. Catheter in place. Continue routine postpositive pain. Plan for discharge planning tomorrow.     Bernard Pride MD
Patient seen and examined, agree with above plan. Will f/u as OP.
Patient seen and examined. Abd - incision c/d/i, ORACIO drain ss with 5 cc in bulb.  - Watt catheter in place, clear yellow urine. Cont. oxybutynin, daily abx and catheter. Check ORACIO output and creat in am. Will need CT cystogram at 14 days prior to catheter removal. Will follow while in house.

## 2021-08-25 NOTE — PROGRESS NOTE ADULT - SUBJECTIVE AND OBJECTIVE BOX
SUBJECTIVE: NAEON. AVSS. Doing well, tolerating PO. No fevers, chills, n/v.    enoxaparin Injectable 40 milliGRAM(s) SubCutaneous every 24 hours  trimethoprim   80 mG/sulfamethoxazole 400 mG 1 Tablet(s) Oral daily      Vital Signs Last 24 Hrs  T(C): 36.6 (25 Aug 2021 12:31), Max: 36.9 (24 Aug 2021 16:48)  T(F): 97.9 (25 Aug 2021 12:31), Max: 98.5 (24 Aug 2021 16:48)  HR: 65 (25 Aug 2021 12:31) (65 - 82)  BP: 108/67 (25 Aug 2021 12:31) (108/67 - 137/77)  BP(mean): --  RR: 16 (25 Aug 2021 12:31) (16 - 18)  SpO2: 100% (25 Aug 2021 12:31) (95% - 100%)  I&O's Detail    24 Aug 2021 07:01  -  25 Aug 2021 07:00  --------------------------------------------------------  IN:    Oral Fluid: 1200 mL  Total IN: 1200 mL    OUT:    Bulb (mL): 150 mL    Indwelling Catheter - Urethral (mL): 2350 mL  Total OUT: 2500 mL    Total NET: -1300 mL      25 Aug 2021 07:01  -  25 Aug 2021 13:16  --------------------------------------------------------  IN:  Total IN: 0 mL    OUT:    Bulb (mL): 10 mL    Indwelling Catheter - Urethral (mL): 900 mL  Total OUT: 910 mL    Total NET: -910 mL          Physical Exam:  General: No acute distress, resting comfortably in bed  Pulm: Nonlabored breathing, no respiratory distress  Abd: soft, appropriately tender  Extrem: warm and well perfused, no edema, SCDs in place    LABS:                        8.7    7.27  )-----------( 186      ( 24 Aug 2021 07:48 )             27.3     08-25    140  |  109<H>  |  8   ----------------------------<  91  3.9   |  26  |  0.67    Ca    8.8      25 Aug 2021 10:55  Phos  3.0     08-24  Mg     1.7     08-24            RADIOLOGY & ADDITIONAL STUDIES:     SUBJECTIVE: NAEON. AVSS. Doing well, tolerating PO. No fevers, chills, n/v.    enoxaparin Injectable 40 milliGRAM(s) SubCutaneous every 24 hours  trimethoprim   80 mG/sulfamethoxazole 400 mG 1 Tablet(s) Oral daily      Vital Signs Last 24 Hrs  T(C): 36.6 (25 Aug 2021 12:31), Max: 36.9 (24 Aug 2021 16:48)  T(F): 97.9 (25 Aug 2021 12:31), Max: 98.5 (24 Aug 2021 16:48)  HR: 65 (25 Aug 2021 12:31) (65 - 82)  BP: 108/67 (25 Aug 2021 12:31) (108/67 - 137/77)  BP(mean): --  RR: 16 (25 Aug 2021 12:31) (16 - 18)  SpO2: 100% (25 Aug 2021 12:31) (95% - 100%)  I&O's Detail    24 Aug 2021 07:01  -  25 Aug 2021 07:00  --------------------------------------------------------  IN:    Oral Fluid: 1200 mL  Total IN: 1200 mL    OUT:    Bulb (mL): 150 mL    Indwelling Catheter - Urethral (mL): 2350 mL  Total OUT: 2500 mL    Total NET: -1300 mL      25 Aug 2021 07:01  -  25 Aug 2021 13:16  --------------------------------------------------------  IN:  Total IN: 0 mL    OUT:    Bulb (mL): 10 mL    Indwelling Catheter - Urethral (mL): 900 mL  Total OUT: 910 mL    Total NET: -910 mL          Physical Exam:  General: No acute distress, resting comfortably in bed  Pulm: Nonlabored breathing, no respiratory distress  Abd: soft, appropriately TTP  : martinez in place draining clear yellow urine. ORACIO output SS.    LABS:                        8.7    7.27  )-----------( 186      ( 24 Aug 2021 07:48 )             27.3     08-25    140  |  109<H>  |  8   ----------------------------<  91  3.9   |  26  |  0.67    Ca    8.8      25 Aug 2021 10:55  Phos  3.0     08-24  Mg     1.7     08-24            RADIOLOGY & ADDITIONAL STUDIES:

## 2021-08-26 PROBLEM — D25.9 LEIOMYOMA OF UTERUS, UNSPECIFIED: Chronic | Status: ACTIVE | Noted: 2021-08-20

## 2021-08-27 ENCOUNTER — APPOINTMENT (OUTPATIENT)
Dept: UROLOGY | Facility: CLINIC | Age: 46
End: 2021-08-27
Payer: COMMERCIAL

## 2021-08-27 VITALS
DIASTOLIC BLOOD PRESSURE: 84 MMHG | OXYGEN SATURATION: 98 % | TEMPERATURE: 97.2 F | HEART RATE: 74 BPM | SYSTOLIC BLOOD PRESSURE: 131 MMHG

## 2021-08-27 PROCEDURE — 99024 POSTOP FOLLOW-UP VISIT: CPT

## 2021-08-27 NOTE — HISTORY OF PRESENT ILLNESS
[FreeTextEntry1] : 46-year-old woman who is now 5 days status post repair of bladder injury during hysterectomy. She had had a ORACIO drain in place which had a creatinine level slightly higher than serum and we were following outputs. Yesterday she noted that it stopped draining. She is here today to have it evaluated. She does have some mild incisional tenderness, otherwise is feeling well.\par \par On exam, incisions are clean dry and intact. The drain is seen to be displaced with the fenestrated portion outside of the body. Given the fact that it is no longer draining, I have removed the drain without difficulty. A dressing was applied. Watt catheter is intact with clear yellow urine.\par \par Plan:\par Continue Watt catheter with bladder drainage. We will set her up for a CT cystogram 2 weeks postoperatively to evaluate the bladder. She will continue daily low-dose antibiotic and anticholinergic.

## 2021-08-27 NOTE — LETTER BODY
[Dear  ___] : Dear  [unfilled], [Courtesy Letter:] : I had the pleasure of seeing your patient, [unfilled], in my office today. [Please see my note below.] : Please see my note below. [Consult Closing:] : Thank you very much for allowing me to participate in the care of this patient.  If you have any questions, please do not hesitate to contact me. [Sincerely,] : Sincerely, [FreeTextEntry3] : Magali Christian MD\par System Director [facility]\par Department of Urology\par [facility] \par   at [facility]\par  of Urology\par [facility] [employer] [facility] [place]\par

## 2021-09-05 LAB — SURGICAL PATHOLOGY STUDY: SIGNIFICANT CHANGE UP

## 2021-09-08 ENCOUNTER — OUTPATIENT (OUTPATIENT)
Dept: OUTPATIENT SERVICES | Facility: HOSPITAL | Age: 46
LOS: 1 days | End: 2021-09-08

## 2021-09-08 ENCOUNTER — RESULT REVIEW (OUTPATIENT)
Age: 46
End: 2021-09-08

## 2021-09-08 ENCOUNTER — APPOINTMENT (OUTPATIENT)
Dept: UROLOGY | Facility: CLINIC | Age: 46
End: 2021-09-08
Payer: COMMERCIAL

## 2021-09-08 ENCOUNTER — APPOINTMENT (OUTPATIENT)
Dept: CT IMAGING | Facility: CLINIC | Age: 46
End: 2021-09-08
Payer: COMMERCIAL

## 2021-09-08 VITALS — SYSTOLIC BLOOD PRESSURE: 123 MMHG | DIASTOLIC BLOOD PRESSURE: 79 MMHG | TEMPERATURE: 98.3 F | HEART RATE: 87 BPM

## 2021-09-08 DIAGNOSIS — Z98.51 TUBAL LIGATION STATUS: Chronic | ICD-10-CM

## 2021-09-08 DIAGNOSIS — Z98.891 HISTORY OF UTERINE SCAR FROM PREVIOUS SURGERY: Chronic | ICD-10-CM

## 2021-09-08 DIAGNOSIS — Z98.890 OTHER SPECIFIED POSTPROCEDURAL STATES: Chronic | ICD-10-CM

## 2021-09-08 PROCEDURE — 74176 CT ABD & PELVIS W/O CONTRAST: CPT | Mod: 26

## 2021-09-08 PROCEDURE — 99024 POSTOP FOLLOW-UP VISIT: CPT

## 2021-09-08 NOTE — LETTER BODY
[Dear  ___] : Dear  [unfilled], [Courtesy Letter:] : I had the pleasure of seeing your patient, [unfilled], in my office today. [Please see my note below.] : Please see my note below. [Consult Closing:] : Thank you very much for allowing me to participate in the care of this patient.  If you have any questions, please do not hesitate to contact me. [Sincerely,] : Sincerely, [FreeTextEntry3] : Magali Christian MD\par System Director [facility]\par Department of Urology\par [facility] \par   at [facility]\par  of Urology\par [facility] [employer] at [facility]\par

## 2021-09-08 NOTE — HISTORY OF PRESENT ILLNESS
[FreeTextEntry1] : 46-year-old woman who is now 2 weeks status post repair of bladder injury during hysterectomy. Denies vaginal leakage. She does have some mild incisional tenderness, otherwise is feeling well.\par \par On exam, incisions are clean dry and intact. Watt catheter is intact with clear yellow urine.\par \par CT cystogram performed today revealed no extravasation/leakage status post repair.  There was a small approximate 1.6 cm diverticulum in the area of the lateral portion of the repair, however everything appears to be healed and sealed.  She did have an incidental finding of a left ovarian hemorrhagic cyst versus endometrioma.\par \par Plan:\par Watt catheter removed today in the office without difficulty.  I did send a copy of her CT report with ovarian findings to Dr. Pride for further evaluation.  At this point I did advise her to make sure she reports any vaginal leakage which would be concerning for a fistula that could form in the future.  Patient aware and agreement with plan.

## 2021-09-15 PROCEDURE — 97116 GAIT TRAINING THERAPY: CPT

## 2021-09-15 PROCEDURE — C9399: CPT

## 2021-09-15 PROCEDURE — 86769 SARS-COV-2 COVID-19 ANTIBODY: CPT

## 2021-09-15 PROCEDURE — 84100 ASSAY OF PHOSPHORUS: CPT

## 2021-09-15 PROCEDURE — 88307 TISSUE EXAM BY PATHOLOGIST: CPT

## 2021-09-15 PROCEDURE — 36415 COLL VENOUS BLD VENIPUNCTURE: CPT

## 2021-09-15 PROCEDURE — 82962 GLUCOSE BLOOD TEST: CPT

## 2021-09-15 PROCEDURE — 82570 ASSAY OF URINE CREATININE: CPT

## 2021-09-15 PROCEDURE — 85027 COMPLETE CBC AUTOMATED: CPT

## 2021-09-15 PROCEDURE — 86900 BLOOD TYPING SEROLOGIC ABO: CPT

## 2021-09-15 PROCEDURE — 80048 BASIC METABOLIC PNL TOTAL CA: CPT

## 2021-09-15 PROCEDURE — 86850 RBC ANTIBODY SCREEN: CPT

## 2021-09-15 PROCEDURE — 86901 BLOOD TYPING SEROLOGIC RH(D): CPT

## 2021-09-15 PROCEDURE — 83735 ASSAY OF MAGNESIUM: CPT

## 2021-09-28 ENCOUNTER — APPOINTMENT (OUTPATIENT)
Dept: OBGYN | Facility: CLINIC | Age: 46
End: 2021-09-28
Payer: COMMERCIAL

## 2021-09-28 VITALS
DIASTOLIC BLOOD PRESSURE: 72 MMHG | HEART RATE: 98 BPM | HEIGHT: 66 IN | WEIGHT: 162 LBS | BODY MASS INDEX: 26.03 KG/M2 | SYSTOLIC BLOOD PRESSURE: 107 MMHG

## 2021-09-28 DIAGNOSIS — Z86.018 PERSONAL HISTORY OF OTHER BENIGN NEOPLASM: ICD-10-CM

## 2021-09-28 DIAGNOSIS — Z41.9 ENCOUNTER FOR PROCEDURE FOR PURPOSES OTHER THAN REMEDYING HEALTH STATE, UNSPECIFIED: ICD-10-CM

## 2021-09-28 DIAGNOSIS — N85.2 HYPERTROPHY OF UTERUS: ICD-10-CM

## 2021-09-28 DIAGNOSIS — N92.0 EXCESSIVE AND FREQUENT MENSTRUATION WITH REGULAR CYCLE: ICD-10-CM

## 2021-09-28 PROCEDURE — 99024 POSTOP FOLLOW-UP VISIT: CPT

## 2021-11-18 ENCOUNTER — APPOINTMENT (OUTPATIENT)
Dept: OBGYN | Facility: CLINIC | Age: 46
End: 2021-11-18
Payer: COMMERCIAL

## 2021-11-18 VITALS
BODY MASS INDEX: 26.2 KG/M2 | SYSTOLIC BLOOD PRESSURE: 111 MMHG | WEIGHT: 163 LBS | HEIGHT: 66 IN | DIASTOLIC BLOOD PRESSURE: 72 MMHG

## 2021-11-18 PROCEDURE — 99024 POSTOP FOLLOW-UP VISIT: CPT

## 2021-11-22 PROBLEM — Z86.018 HISTORY OF UTERINE LEIOMYOMA: Status: RESOLVED | Noted: 2021-07-06 | Resolved: 2021-11-22

## 2021-11-22 PROBLEM — N92.0 MENORRHAGIA WITH REGULAR CYCLE: Status: RESOLVED | Noted: 2021-07-06 | Resolved: 2021-11-22

## 2021-11-22 PROBLEM — Z41.9 ELECTIVE SURGERY: Status: RESOLVED | Noted: 2021-08-25 | Resolved: 2021-11-22

## 2021-11-22 PROBLEM — N85.2 ENLARGED UTERUS: Status: RESOLVED | Noted: 2021-07-06 | Resolved: 2021-11-22

## 2021-11-22 NOTE — HISTORY OF PRESENT ILLNESS
[FreeTextEntry1] : 45 yo presents for postoperative visit s/p total laparoscopic hysterectomy, bilateral salpingectomy, cystotomy repair on 8/23.  She reports regular voiding and bowel movements, denies fevers/chills/dysuria.  Not taking pain medication. Catheter was removed by Dr Christian she still having some bladder discomfort but improving.

## 2021-11-22 NOTE — PHYSICAL EXAM
[Appropriately responsive] : appropriately responsive [Alert] : alert [No Acute Distress] : no acute distress [Soft] : soft [Non-tender] : non-tender [Oriented x3] : oriented x3 [Labia Majora] : normal [Labia Minora] : normal [Normal] : normal [Absent] : absent [Uterine Adnexae] : normal [FreeTextEntry4] : cuff healing well

## 2021-11-22 NOTE — DISCUSSION/SUMMARY
[FreeTextEntry1] : 45 yo s/p s.p TLH/BS/cystotomy repair on 8/23 presents for postoperative visit doing well\par -given copy of pathology and operative report\par -continue pelvic rest with modified activity\par -follow up 1 month

## 2021-11-22 NOTE — DISCUSSION/SUMMARY
[FreeTextEntry1] : 45 yo s/p s.p TLH/BS/cystotomy repair on 8/23 presents for postoperative visit doing well\par -resume all activity without limitation\par -resume routine gyn care \par -follow up PRN

## 2021-11-22 NOTE — COUNSELING
[Nutrition/ Exercise/ Weight Management] : nutrition, exercise, weight management [Vitamins/Supplements] : vitamins/supplements [Pre/Post Op Instructions] : pre/post op instructions [Other ___] : [unfilled]

## 2022-08-01 NOTE — PRE-OP CHECKLIST - 1.
Urine HCG negative
Bed in lowest position, wheels locked, appropriate side rails in place/Call bell, personal items and telephone in reach/Instruct patient to call for assistance before getting out of bed or chair/Non-slip footwear when patient is out of bed/[place] to call system/Physically safe environment - no spills, clutter or unnecessary equipment/Purposeful Proactive Rounding/Room/bathroom lighting operational, light cord in reach

## 2022-11-17 ENCOUNTER — APPOINTMENT (OUTPATIENT)
Dept: OBGYN | Facility: CLINIC | Age: 47
End: 2022-11-17

## 2023-02-02 ENCOUNTER — APPOINTMENT (OUTPATIENT)
Dept: OBGYN | Facility: CLINIC | Age: 48
End: 2023-02-02

## 2023-02-23 ENCOUNTER — APPOINTMENT (OUTPATIENT)
Dept: OBGYN | Facility: CLINIC | Age: 48
End: 2023-02-23
Payer: COMMERCIAL

## 2023-02-23 VITALS
BODY MASS INDEX: 25.07 KG/M2 | HEIGHT: 66 IN | SYSTOLIC BLOOD PRESSURE: 115 MMHG | OXYGEN SATURATION: 99 % | WEIGHT: 156 LBS | DIASTOLIC BLOOD PRESSURE: 65 MMHG | HEART RATE: 97 BPM

## 2023-02-23 PROCEDURE — 99396 PREV VISIT EST AGE 40-64: CPT

## 2023-02-23 NOTE — DISCUSSION/SUMMARY
[FreeTextEntry1] : 49 yo patient presents for well women visit, doing well.\par \par HCM:  PAP not indicated,STI' testing declined,will have done at PCP, breast imaging up to date, cologuard form given to patient.\par \par -Perimenopause literature given to patient \par \par -f/u 1 year/PRN \par \par All questions and concerns addressed, patient expressed understanding. Encouraged to contact the office with any questions or concerns.\par

## 2023-02-23 NOTE — HISTORY OF PRESENT ILLNESS
[Patient reported mammogram was normal] : Patient reported mammogram was normal [Patient reported breast sonogram was normal] : Patient reported breast sonogram was normal [LMP unknown] : LMP unknown [N] : Patient does not use contraception [Y] : Positive pregnancy history [unknown] : Patient is unsure of the date of her LMP [Menarche Age: ____] : age at menarche was [unfilled] [Currently Active] : currently active [Men] : men [Vaginal] : vaginal [No] : No [Patient would like to be screened for STIs] : Patient would like to be screened for STIs [FreeTextEntry1] : 49 yo present well woman exam. Patient currently has no complaints. Patient states she will every now and then feel "off". She states sometimes at night she has to change her night gown because its "wet" from sweating, she is not sure if its from hot flashes or because she sleeps with a big comforter.  [Mammogramdate] : 06/22 [BreastSonogramDate] : 06/22 [PapSmeardate] : 08/21 [PGHxTotal] : 2 [[facility]] : 2 None known

## 2023-02-23 NOTE — PHYSICAL EXAM
[Chaperone Present] : A chaperone was present in the examining room during all aspects of the physical examination [Appropriately responsive] : appropriately responsive [Alert] : alert [No Acute Distress] : no acute distress [No Lymphadenopathy] : no lymphadenopathy [Soft] : soft [Non-tender] : non-tender [Non-distended] : non-distended [Oriented x3] : oriented x3 [Examination Of The Breasts] : a normal appearance [No Discharge] : no discharge [No Masses] : no breast masses were palpable [Labia Majora] : normal [Labia Minora] : normal [Normal] : normal [Absent] : absent [Uterine Adnexae] : normal

## 2023-02-23 NOTE — COUNSELING
[Other ___] : [unfilled] [Nutrition/ Exercise/ Weight Management] : nutrition, exercise, weight management [Vitamins/Supplements] : vitamins/supplements [STD (testing, results, tx)] : STD (testing, results, tx)

## 2023-03-20 ENCOUNTER — TRANSCRIPTION ENCOUNTER (OUTPATIENT)
Age: 48
End: 2023-03-20

## 2023-10-03 NOTE — DISCHARGE NOTE NURSING/CASE MANAGEMENT/SOCIAL WORK - BRAND OF COVID-19 VACCINATION
Thank you for involving me in Neto 's care today!  Apply eye ointment over the lashes and blink a few times to get the medicine in the eye. Continue warm compresses.  Follow up in 1 week if symptoms do not improve.    Pfizer dose 1 and 2

## 2024-02-27 ENCOUNTER — NON-APPOINTMENT (OUTPATIENT)
Age: 49
End: 2024-02-27

## 2024-03-19 ENCOUNTER — APPOINTMENT (OUTPATIENT)
Dept: OBGYN | Facility: CLINIC | Age: 49
End: 2024-03-19
Payer: COMMERCIAL

## 2024-03-19 VITALS
DIASTOLIC BLOOD PRESSURE: 73 MMHG | HEIGHT: 66 IN | BODY MASS INDEX: 23.3 KG/M2 | WEIGHT: 145 LBS | OXYGEN SATURATION: 99 % | HEART RATE: 85 BPM | SYSTOLIC BLOOD PRESSURE: 123 MMHG

## 2024-03-19 DIAGNOSIS — Z11.3 ENCOUNTER FOR SCREENING FOR INFECTIONS WITH A PREDOMINANTLY SEXUAL MODE OF TRANSMISSION: ICD-10-CM

## 2024-03-19 DIAGNOSIS — Z98.890 OTHER SPECIFIED POSTPROCEDURAL STATES: ICD-10-CM

## 2024-03-19 DIAGNOSIS — Z01.419 ENCOUNTER FOR GYNECOLOGICAL EXAMINATION (GENERAL) (ROUTINE) W/OUT ABNORMAL FINDINGS: ICD-10-CM

## 2024-03-19 DIAGNOSIS — S37.20XA UNSPECIFIED INJURY OF BLADDER, INITIAL ENCOUNTER: ICD-10-CM

## 2024-03-19 PROCEDURE — 99396 PREV VISIT EST AGE 40-64: CPT

## 2024-03-19 NOTE — PHYSICAL EXAM
[Chaperone Present] : A chaperone was present in the examining room during all aspects of the physical examination [Appropriately responsive] : appropriately responsive [Alert] : alert [No Acute Distress] : no acute distress [No Lymphadenopathy] : no lymphadenopathy [Soft] : soft [Non-distended] : non-distended [Non-tender] : non-tender [No Lesions] : no lesions [Oriented x3] : oriented x3 [Labia Minora] : normal [Labia Majora] : normal [Absent] : absent [Normal] : normal [Uterine Adnexae] : normal [FreeTextEntry6] : declined exam recent surgery, normal appearance surgical incisions healing [] : implants [Breast Reconstruction Right] : breast reconstruction [Breast Reconstruction Left] : breast reconstruction

## 2024-03-19 NOTE — HISTORY OF PRESENT ILLNESS
[Patient reported mammogram was normal] : Patient reported mammogram was normal [N] : Patient does not use contraception [Y] : Positive pregnancy history [Currently Active] : currently active [Men] : men [Hot Flashes] : hot flashes [Night Sweats] : night sweats [Insomnia] : insomnia [No] : none [Experiencing Menopausal Sxs] : experiencing menopausal symptoms [Menopause Age: ____] : age at menopause was [unfilled] [Patient would like to be screened for STIs] : Patient would like to be screened for STIs [FreeTextEntry1] : 48 yo patient presents for well women visit, no complaints or concerns. Recently had breast augmentation.  [Mammogramdate] : 10/23/23

## 2024-03-19 NOTE — DISCUSSION/SUMMARY
[FreeTextEntry1] : 50 yo patient presents for well women visit, doing well.   HCM: PAP not indicated, breast imaging recently done prior to surgery, Colon Cologuard done last year  f/u 1 year/PRN   All questions and concerns addressed, patient expressed understanding. Encouraged to contact the office with any questions or concerns.

## 2024-03-20 LAB
HBV SURFACE AG SER QL: NONREACTIVE
HCV AB SER QL: NONREACTIVE
HCV S/CO RATIO: 0.24 S/CO
HIV1+2 AB SPEC QL IA.RAPID: NONREACTIVE
T PALLIDUM AB SER QL IA: NEGATIVE

## 2024-03-21 ENCOUNTER — TRANSCRIPTION ENCOUNTER (OUTPATIENT)
Age: 49
End: 2024-03-21

## 2024-03-21 LAB
C TRACH RRNA SPEC QL NAA+PROBE: NOT DETECTED
N GONORRHOEA RRNA SPEC QL NAA+PROBE: NOT DETECTED
SOURCE AMPLIFICATION: NORMAL

## 2024-10-07 DIAGNOSIS — Z12.39 ENCOUNTER FOR OTHER SCREENING FOR MALIGNANT NEOPLASM OF BREAST: ICD-10-CM

## 2024-11-15 ENCOUNTER — TRANSCRIPTION ENCOUNTER (OUTPATIENT)
Age: 49
End: 2024-11-15